# Patient Record
Sex: MALE | Race: OTHER | URBAN - METROPOLITAN AREA
[De-identification: names, ages, dates, MRNs, and addresses within clinical notes are randomized per-mention and may not be internally consistent; named-entity substitution may affect disease eponyms.]

---

## 2024-05-16 ENCOUNTER — OFFICE VISIT (OUTPATIENT)
Dept: FAMILY MEDICINE CLINIC | Facility: CLINIC | Age: 29
End: 2024-05-16

## 2024-05-16 VITALS
SYSTOLIC BLOOD PRESSURE: 130 MMHG | DIASTOLIC BLOOD PRESSURE: 72 MMHG | TEMPERATURE: 99 F | WEIGHT: 184 LBS | BODY MASS INDEX: 27.89 KG/M2 | RESPIRATION RATE: 16 BRPM | HEIGHT: 68 IN | OXYGEN SATURATION: 98 % | HEART RATE: 63 BPM

## 2024-05-16 DIAGNOSIS — L81.9 DISCOLORATION OF SKIN OF LOWER LEG: ICD-10-CM

## 2024-05-16 DIAGNOSIS — L30.9 DERMATITIS, UNSPECIFIED: ICD-10-CM

## 2024-05-16 DIAGNOSIS — S40.021A ARM BRUISE, RIGHT, INITIAL ENCOUNTER: Primary | ICD-10-CM

## 2024-05-16 PROCEDURE — 99202 OFFICE O/P NEW SF 15 MIN: CPT | Performed by: PHYSICIAN ASSISTANT

## 2024-05-16 RX ORDER — TRIAMCINOLONE ACETONIDE 0.25 MG/G
1 OINTMENT TOPICAL 2 TIMES DAILY
Qty: 15 G | Refills: 0 | Status: SHIPPED | OUTPATIENT
Start: 2024-05-16 | End: 2024-05-23

## 2024-05-16 NOTE — PROGRESS NOTES
CHIEF COMPLAINT:     Chief Complaint   Patient presents with    Skin     Sx this AM - Slight itchiness on R arm, L calf, and R arm  Denies burning, bleeding, injury  No OTC       HPI:     Bay Stallings is a 28 year old male who presents with concerns of multiple skin changes. First he has concerns of right biceps region, discoloration, SNEHA thought it looked like ringworm.  Patient denies any pain, scaling, itching or redness to the area.  Second concern is left elbow, small reddish bumps that itch only when touched.  Last concern is left lower extremity has mildly reddened appearance.  He has applied an unknown ointment to all areas without any noticeable difference. He feels well otherwise.  He goes to gym to exercise so wife wanted him seen to r/o ringworm. He denies any known injuries. No new soaps, detergents, no plant exposures.       No current outpatient medications on file.      History reviewed. No pertinent past medical history.   Social History:  Social History     Socioeconomic History    Marital status: Unknown   Tobacco Use    Smoking status: Never     Passive exposure: Never    Smokeless tobacco: Never        REVIEW OF SYSTEMS:   GENERAL: feels well otherwise, no fever, no chills.  SKIN: as above.  CHEST: no chest pains, no palpitations.  MUSC/SKEL: no joint swelling, no joint stiffness.  NEURO: no abnormal sensation, no tingling of the skin or numbness.    EXAM:   /72   Pulse 63   Temp 98.7 °F (37.1 °C)   Resp 16   Ht 5' 8\" (1.727 m)   Wt 184 lb (83.5 kg)   SpO2 98%   BMI 27.98 kg/m²   GENERAL: well developed, well nourished,in no apparent distress  SKIN: right upper extremity--biceps region with healing bruise,  left antecubital fossa with crop papular lesions, left lower extremity, posterior aspect with slightly pink discoloration  HEAD: atraumatic, normocephalic  EYES: conjunctiva clear, EOM intact  NOSE: Normal external nose.  No rhinorrhea.  NECK: supple, non-tender  LUNGS: clear to  auscultation bilaterally, no wheezes or rhonchi. Breathing is non labored.  CARDIO: RRR without murmur  EXTREMITIES: no cyanosis, clubbing or edema.  Good perfusion. No calf pain. Negative Morena.                      ASSESSMENT AND PLAN:     ASSESSMENT:  Encounter Diagnoses   Name Primary?    Arm bruise, right, initial encounter Yes    Dermatitis, unspecified     Discoloration of skin of lower leg        PLAN: Reassurance. Skin care discussed with patient. Instructions and Comfort Care as listed in Patient Instructions.  Medication as below.    Requested Prescriptions     Signed Prescriptions Disp Refills    triamcinolone 0.025 % External Ointment 15 g 0     Sig: Apply 1 Application topically 2 (two) times daily for 7 days.     Risks, benefits, side effects of medication explained and discussed.       The patient indicates understanding of these issues and agrees to the plan.  The patient is asked to see PCP if symptoms not improving or for worsening symptoms.